# Patient Record
Sex: FEMALE | Race: OTHER
[De-identification: names, ages, dates, MRNs, and addresses within clinical notes are randomized per-mention and may not be internally consistent; named-entity substitution may affect disease eponyms.]

---

## 2019-07-04 ENCOUNTER — HOSPITAL ENCOUNTER (EMERGENCY)
Dept: HOSPITAL 72 - EMR | Age: 29
LOS: 1 days | Discharge: HOME | End: 2019-07-05
Payer: COMMERCIAL

## 2019-07-04 VITALS — BODY MASS INDEX: 30.24 KG/M2 | HEIGHT: 59 IN | WEIGHT: 150 LBS

## 2019-07-04 VITALS — DIASTOLIC BLOOD PRESSURE: 76 MMHG | SYSTOLIC BLOOD PRESSURE: 118 MMHG

## 2019-07-04 DIAGNOSIS — S05.91XA: Primary | ICD-10-CM

## 2019-07-04 DIAGNOSIS — R11.0: ICD-10-CM
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
## 2019-07-04 PROCEDURE — 99282 EMERGENCY DEPT VISIT SF MDM: CPT
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
## 2019-07-04 NOTE — NUR
ED Nurse Note:





pt refused motrin, pt states she took motrin 200mg and tylenol 500mg at home, 
pt reports nausea, ERMD notified.

## 2019-07-05 VITALS — SYSTOLIC BLOOD PRESSURE: 115 MMHG | DIASTOLIC BLOOD PRESSURE: 71 MMHG

## 2019-07-05 VITALS — DIASTOLIC BLOOD PRESSURE: 71 MMHG | SYSTOLIC BLOOD PRESSURE: 115 MMHG
